# Patient Record
Sex: FEMALE | Race: WHITE | NOT HISPANIC OR LATINO | Employment: FULL TIME | URBAN - METROPOLITAN AREA
[De-identification: names, ages, dates, MRNs, and addresses within clinical notes are randomized per-mention and may not be internally consistent; named-entity substitution may affect disease eponyms.]

---

## 2021-04-28 ENCOUNTER — PATIENT MESSAGE (OUTPATIENT)
Dept: RESEARCH | Facility: HOSPITAL | Age: 31
End: 2021-04-28

## 2021-10-04 RX ORDER — ONDANSETRON 4 MG/1
4 TABLET, ORALLY DISINTEGRATING ORAL
COMMUNITY
Start: 2021-04-14

## 2021-10-04 RX ORDER — DOXYLAMINE SUCCINATE AND PYRIDOXINE HYDROCHLORIDE 20; 20 MG/1; MG/1
TABLET, EXTENDED RELEASE ORAL
COMMUNITY
Start: 2021-06-08

## 2021-10-04 RX ORDER — ESCITALOPRAM OXALATE 5 MG/1
TABLET ORAL
COMMUNITY
End: 2022-06-01

## 2021-10-04 RX ORDER — METOCLOPRAMIDE 10 MG/1
TABLET ORAL
COMMUNITY
Start: 2021-05-24

## 2024-06-27 PROBLEM — N64.4 MASTALGIA: Status: ACTIVE | Noted: 2024-06-27

## 2024-06-27 PROBLEM — N64.3 GALACTORRHEA: Status: ACTIVE | Noted: 2024-06-27

## 2024-06-27 NOTE — PROGRESS NOTES
"Breast Surgical Oncology  [facility]    Date of Service: 2024    SUBJECTIVE:   Chief complaint: right breast infection    HISTORY OF PRESENT ILLNESS:   Bere Lei is a 33 y.o. female who is kindly referred by Dr. Arabella Rice for right breast infection.    She reports a 2 year history of continue bilateral nipple discharge after cessation of breastfeeding. It fluctuates between milky, serous and green. She had multiple episodes of mastitis and formed right breast galactoceles during her breastfeeding journey.     Most recently in 2024 she developed rihgt breast lower outer quadrant tenderness with associated nipple discharge. Focused sonographic evaluation showed a tubular shaped hypoechoic collection just beneath the skin surface that is characteristic of superficial thrombophlebitis in the setting of right breast pain and swelling at the site of the palpable area of concern in the right breast at the 3 o'clock position. She was recommended anti-inflammatory medication and warm compresses. Her nipple discharge was cultured and it showed rare Gram + cocci. She was begun on Dicloxacillin PO. She reports gradual improvement of her symptoms and resolution of her palpable complaints and pain.     She denies current breast concerns such as pain, masses, skin changes, nipple retraction or lumps under the arm. She reports new onset of urine odor resembling "ammonia".     Her breast cancer risk factor profile is as follows: Menarche at 14, Menopause at N/A.  She is . Age at first live birth was 25. Family history of cancer is as follows: none    FAMILY HISTORY:     Family History   Problem Relation Name Age of Onset    Hyperlipidemia Father      Hypertension Mother      Hypertension Maternal Grandmother      Hypertension Paternal Grandfather      Hypertension Maternal Aunt          PAST MEDICAL HISTORY:     Past Medical History:   Diagnosis Date    Anxiety     Blood type, Rh negative     History of " D&C     [place] teeth extracted        SURGICAL HISTORY:     Past Surgical History:   Procedure Laterality Date     SECTION      DILATION AND CURETTAGE OF UTERUS      HSC, D&C, Ablation   2023    [facility]    TUBAL LIGATION      WISDOM TOOTH EXTRACTION         SOCIAL HISTORY:     Social History     Tobacco Use    Smoking status: Never    Smokeless tobacco: Never   Substance Use Topics    Alcohol use: Yes    Drug use: Never        MEDICATIONS/ALLERGIES:     Current Outpatient Medications:     citalopram (CELEXA) 10 MG tablet, Take 1 tablet by mouth every morning., Disp: , Rfl:     clobetasoL (TEMOVATE) 0.05 % cream, Apply topically 2 (two) times daily. (Patient not taking: Reported on 2022), Disp: 1 g, Rfl: 1    doxylamine-pyridoxine, vit B6, (BONJESTA) 20-20 mg TbID, Bonjesta 20 mg-20 mg tablet,immediate and delay release take 1 tablet by oral route 2 times per day in the morning and at bedtime for 30 days 2021  Active (Patient not taking: Reported on 2024), Disp: , Rfl:     EScitalopram oxalate (LEXAPRO) 10 MG tablet, Take 10 mg by mouth every morning. (Patient not taking: Reported on 2024), Disp: , Rfl:     FOLIC ACID ORAL, , Disp: , Rfl:     metoclopramide HCl (REGLAN) 10 MG tablet, Reglan oral tablet 10 mg take 1 tablet by oral route 2 times a day for 30 days 2021  Active (Patient not taking: Reported on 2024), Disp: , Rfl:     norethindrone-ethinyl estradiol (JUNEL ) 1 mg-20 mcg (21)/75 mg (7) per tablet, Take 1 tablet by mouth once daily. (Patient not taking: Reported on 10/26/2022), Disp: 28 tablet, Rfl: 11    ondansetron (ZOFRAN-ODT) 4 MG TbDL, Take 4 mg by mouth. (Patient not taking: Reported on 2024), Disp: , Rfl:     prenatal vit calc,iron,folic (PRENATAL VITAMIN ORAL), , Disp: , Rfl:   Review of patient's allergies indicates:   Allergen Reactions    Sertraline      Other reaction(s): passing out       REVIEW OF SYSTEMS:   I have reviewed 12 systems,  including 2 points per system. Pertinent reported positives are: unexpected weight change, fever, night sweats    PHYSICAL EXAM:   General: The patient appears well and is in no acute distress.     Rafa Plasencia MA was present as a chaperone for the examination.   BREAST EXAM  No Asymmetry  Right:  - Mass: No  - Skin change: No  - Nipple Discharge: No  - Nipple retraction: No  - Axillary LAD: No  Left:   - Mass: No  - Skin change: No  - Nipple Discharge: No  - Nipple retraction: No  - Axillary LAD: No    IMAGING:   Images were personally reviewed.   Results for orders placed in visit on 04/09/24    US Breast Right Complete    Narrative  Result:  US Breast Right Complete    History:  Patient is 33 y.o. and is seen for diagnostic imaging.    Films Compared:  Compared to: 02/05/2024 Mammo Digital Screening Bilat w/ Alex and 04/13/2022 US Breast Right Limited    Findings:    At the site of the palpable area of concern in the right breast at the 3 o'clock position there is a a tubular shaped hypoechoic collection just beneath the skin surface that is characteristic of superficial thrombophlebitis in the setting of right breast pain and swelling.  Recommend clinical correlation.  A  right breast mass at the 9 o'clock position is stable compared to the ultrasound 2 years ago characteristic of a benign fibroadenoma.    Impression  Benign superficial thrombophlebitis at the site of the palpable area of concern in the right breast.  Clinically correlate.    BI-RADS Category:  Overall: 2 - Benign      Recommendation:  Annual mammogram is recommended beginning at age 40.    PATHOLOGY:   none    ASSESSMENT:     1. Mastalgia    2. Galactorrhea          PLAN:     Her breast examination was benign today. Her symptoms have resolved. There is no longer evidence of superficial thrombophlebitis.     We have reviewed that breast pain is overwhelmingly benign.  There is no national guideline that mandates routine mammography for the  evaluation of generalized  cyclical breast pain.  We reviewed that reducing caffeine intake and vitamin D supplementation can improve the symptom of pain.      We had a long discussion concerning her milky discharge. She understands that most of these discharges are idiopathic. Her prolactin level was normal. We discussed all major etiologies (as above) in additional to neurological, medicinal, and physiological causes. We reviewed her medication list and compared this to known drugs that cause galactorrhea. We also discussed chest wall irritation and breast manipulation as aggravating factors. If no specific etiology can be found - she was reassured that most of these discharges spontaneously resolve with time. She knows that we will follow her to find resolution to this issue. I have recommended binding of the breast, no massage or manipulation, the above labs, etc. She understands the importance of monthly self-breast examination and is to report changes as they occur.    We will perform a urinalysis today to assess her complains and notify her of the results.     I spent a total of 45 minutes on this visit. This includes face to face time and non-face to face time preparing to see the patient (eg, review of tests), obtaining and/or reviewing separately obtained history, documenting clinical information in the electronic or other health record, independently interpreting results and communicating results to the patient/family/caregiver, or care coordinator.        Jen Campbell M.D.

## 2024-06-28 ENCOUNTER — OFFICE VISIT (OUTPATIENT)
Dept: SURGERY | Facility: CLINIC | Age: 34
End: 2024-06-28
Payer: COMMERCIAL

## 2024-06-28 DIAGNOSIS — N64.3 GALACTORRHEA NOT ASSOCIATED WITH CHILDBIRTH: ICD-10-CM

## 2024-06-28 DIAGNOSIS — R82.90 ABNORMAL URINE ODOR: Primary | ICD-10-CM

## 2024-06-28 DIAGNOSIS — N64.4 BREAST PAIN: ICD-10-CM

## 2024-06-28 DIAGNOSIS — N64.3 GALACTORRHEA: ICD-10-CM

## 2024-06-28 DIAGNOSIS — N64.4 MASTALGIA: ICD-10-CM

## 2024-06-28 PROCEDURE — 99999 PR PBB SHADOW E&M-EST. PATIENT-LVL I: CPT | Mod: PBBFAC,,, | Performed by: SURGERY

## 2024-07-30 ENCOUNTER — TELEPHONE (OUTPATIENT)
Dept: SURGERY | Facility: CLINIC | Age: 34
End: 2024-07-30
Payer: COMMERCIAL

## 2024-07-30 NOTE — TELEPHONE ENCOUNTER
Returned phone call to patient. Patient mentioned that she had a urinalysis ordered by our office on 6/28/24, but she was never contacted regarding the results. I contacted [facility], and requested the results of the urinalysis be faxed over to our office. I asked Fallon Brandon NP to review the results, and I informed the patient that the urinalysis results were normal. Patient voiced understanding.

## 2024-07-30 NOTE — TELEPHONE ENCOUNTER
----- Message from Michelle Nassar sent at 7/29/2024  4:36 PM CDT -----  Type:  Test Results    Who Called: ROSIBEL BARRIOS [[id number]]    Name of Test (Lab/Mammo/Etc): labs     Date of Test: 06/28    Ordering Provider: avinash    Where the test was performed: [facility]     Would the patient rather a call back or a response via [facility]? Call back     Best Call Back Number:  [phone]    Additional Information:  Patient states she had some labs isabella previously for the providers office but has not gotten any information on the results. Patient states she was informed by a representative in billing that one of her results came back as abnormal and would like to speak with someone as soon as possible.

## 2024-10-24 ENCOUNTER — PATIENT MESSAGE (OUTPATIENT)
Dept: RESEARCH | Facility: HOSPITAL | Age: 34
End: 2024-10-24
Payer: COMMERCIAL